# Patient Record
Sex: FEMALE | Race: WHITE | Employment: STUDENT | URBAN - METROPOLITAN AREA
[De-identification: names, ages, dates, MRNs, and addresses within clinical notes are randomized per-mention and may not be internally consistent; named-entity substitution may affect disease eponyms.]

---
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
## 2024-06-25 ENCOUNTER — HOSPITAL ENCOUNTER (INPATIENT)
Age: 19
LOS: 6 days | Discharge: HOME | DRG: 753 | End: 2024-07-01
Attending: EMERGENCY MEDICINE | Admitting: PSYCHIATRY & NEUROLOGY
Payer: MEDICAID

## 2024-06-25 DIAGNOSIS — F31.9 BIPOLAR AFFECTIVE DISORDER, REMISSION STATUS UNSPECIFIED (HCC): ICD-10-CM

## 2024-06-25 DIAGNOSIS — N30.00 ACUTE CYSTITIS WITHOUT HEMATURIA: Primary | ICD-10-CM

## 2024-06-25 LAB
ALBUMIN SERPL-MCNC: 4.7 G/DL (ref 3.5–4.6)
ALP SERPL-CCNC: 88 U/L (ref 40–130)
ALT SERPL-CCNC: 27 U/L (ref 0–33)
AMPHET UR QL SCN: NORMAL
ANION GAP SERPL CALCULATED.3IONS-SCNC: 10 MEQ/L (ref 9–15)
APAP SERPL-MCNC: <5 UG/ML (ref 10–30)
AST SERPL-CCNC: 18 U/L (ref 0–35)
BACTERIA URNS QL MICRO: ABNORMAL /HPF
BARBITURATES UR QL SCN: NORMAL
BASOPHILS # BLD: 0 K/UL (ref 0–0.2)
BASOPHILS NFR BLD: 0.3 %
BENZODIAZ UR QL SCN: NORMAL
BILIRUB SERPL-MCNC: <0.2 MG/DL (ref 0.2–0.7)
BILIRUB UR QL STRIP: NEGATIVE
BUN SERPL-MCNC: 14 MG/DL (ref 6–20)
CALCIUM SERPL-MCNC: 9.9 MG/DL (ref 8.5–9.9)
CANNABINOIDS UR QL SCN: NORMAL
CHLORIDE SERPL-SCNC: 105 MEQ/L (ref 95–107)
CHOLEST SERPL-MCNC: 163 MG/DL (ref 0–199)
CK SERPL-CCNC: 81 U/L (ref 0–170)
CLARITY UR: ABNORMAL
CO2 SERPL-SCNC: 23 MEQ/L (ref 20–31)
COCAINE UR QL SCN: NORMAL
COLOR UR: YELLOW
CREAT SERPL-MCNC: 0.62 MG/DL (ref 0.5–0.9)
DRUG SCREEN COMMENT UR-IMP: NORMAL
EOSINOPHIL # BLD: 0.1 K/UL (ref 0–0.7)
EOSINOPHIL NFR BLD: 0.7 %
EPI CELLS #/AREA URNS AUTO: ABNORMAL /HPF (ref 0–5)
ERYTHROCYTE [DISTWIDTH] IN BLOOD BY AUTOMATED COUNT: 12.6 % (ref 11.5–14.5)
ESTIMATED AVERAGE GLUCOSE: 100 MG/DL
ETHANOL PERCENT: NORMAL G/DL
ETHANOLAMINE SERPL-MCNC: <10 MG/DL (ref 0–0.08)
FENTANYL SCREEN, URINE: NORMAL
GLOBULIN SER CALC-MCNC: 3.2 G/DL (ref 2.3–3.5)
GLUCOSE SERPL-MCNC: 99 MG/DL (ref 70–99)
GLUCOSE UR STRIP-MCNC: NEGATIVE MG/DL
HBA1C MFR BLD: 5.1 % (ref 4–6)
HCG UR QL: NEGATIVE
HCT VFR BLD AUTO: 41.1 % (ref 37–47)
HDLC SERPL-MCNC: 37 MG/DL (ref 40–59)
HGB BLD-MCNC: 14.4 G/DL (ref 12–16)
HGB UR QL STRIP: NEGATIVE
HYALINE CASTS #/AREA URNS AUTO: ABNORMAL /HPF (ref 0–5)
KETONES UR STRIP-MCNC: NEGATIVE MG/DL
LDLC SERPL CALC-MCNC: 103 MG/DL (ref 0–129)
LEUKOCYTE ESTERASE UR QL STRIP: ABNORMAL
LYMPHOCYTES # BLD: 2.7 K/UL (ref 1–4.8)
LYMPHOCYTES NFR BLD: 36.3 %
MCH RBC QN AUTO: 31 PG (ref 27–31.3)
MCHC RBC AUTO-ENTMCNC: 35 % (ref 33–37)
MCV RBC AUTO: 88.6 FL (ref 79.4–94.8)
METHADONE UR QL SCN: NORMAL
MONOCYTES # BLD: 0.5 K/UL (ref 0.2–0.8)
MONOCYTES NFR BLD: 6.6 %
NEUTROPHILS # BLD: 4.1 K/UL (ref 1.4–6.5)
NEUTS SEG NFR BLD: 55.8 %
NITRITE UR QL STRIP: NEGATIVE
OPIATES UR QL SCN: NORMAL
OXYCODONE UR QL SCN: NORMAL
PCP UR QL SCN: NORMAL
PH UR STRIP: 7 [PH] (ref 5–9)
PLATELET # BLD AUTO: 334 K/UL (ref 130–400)
POTASSIUM SERPL-SCNC: 4.2 MEQ/L (ref 3.4–4.9)
PROPOXYPH UR QL SCN: NORMAL
PROT SERPL-MCNC: 7.9 G/DL (ref 6.3–8)
PROT UR STRIP-MCNC: NEGATIVE MG/DL
RBC # BLD AUTO: 4.64 M/UL (ref 4.2–5.4)
RBC #/AREA URNS AUTO: ABNORMAL /HPF (ref 0–5)
SALICYLATES SERPL-MCNC: <0.3 MG/DL (ref 15–30)
SODIUM SERPL-SCNC: 138 MEQ/L (ref 135–144)
SP GR UR STRIP: 1.02 (ref 1–1.03)
TRIGL SERPL-MCNC: 113 MG/DL (ref 0–150)
TSH SERPL-MCNC: 1.48 UIU/ML (ref 0.44–3.86)
URINE REFLEX TO CULTURE: YES
UROBILINOGEN UR STRIP-ACNC: 1 E.U./DL
WBC # BLD AUTO: 7.3 K/UL (ref 4.5–11)
WBC #/AREA URNS AUTO: ABNORMAL /HPF (ref 0–5)

## 2024-06-25 PROCEDURE — 6370000000 HC RX 637 (ALT 250 FOR IP): Performed by: PSYCHIATRY & NEUROLOGY

## 2024-06-25 PROCEDURE — 80143 DRUG ASSAY ACETAMINOPHEN: CPT

## 2024-06-25 PROCEDURE — 80307 DRUG TEST PRSMV CHEM ANLYZR: CPT

## 2024-06-25 PROCEDURE — 81003 URINALYSIS AUTO W/O SCOPE: CPT

## 2024-06-25 PROCEDURE — 85025 COMPLETE CBC W/AUTO DIFF WBC: CPT

## 2024-06-25 PROCEDURE — 84443 ASSAY THYROID STIM HORMONE: CPT

## 2024-06-25 PROCEDURE — 80061 LIPID PANEL: CPT

## 2024-06-25 PROCEDURE — 84703 CHORIONIC GONADOTROPIN ASSAY: CPT

## 2024-06-25 PROCEDURE — 80179 DRUG ASSAY SALICYLATE: CPT

## 2024-06-25 PROCEDURE — 99285 EMERGENCY DEPT VISIT HI MDM: CPT

## 2024-06-25 PROCEDURE — 80053 COMPREHEN METABOLIC PANEL: CPT

## 2024-06-25 PROCEDURE — 87086 URINE CULTURE/COLONY COUNT: CPT

## 2024-06-25 PROCEDURE — 1240000000 HC EMOTIONAL WELLNESS R&B

## 2024-06-25 PROCEDURE — 86403 PARTICLE AGGLUT ANTBDY SCRN: CPT

## 2024-06-25 PROCEDURE — 6370000000 HC RX 637 (ALT 250 FOR IP): Performed by: EMERGENCY MEDICINE

## 2024-06-25 PROCEDURE — 82550 ASSAY OF CK (CPK): CPT

## 2024-06-25 PROCEDURE — 36415 COLL VENOUS BLD VENIPUNCTURE: CPT

## 2024-06-25 PROCEDURE — 82077 ASSAY SPEC XCP UR&BREATH IA: CPT

## 2024-06-25 PROCEDURE — 83036 HEMOGLOBIN GLYCOSYLATED A1C: CPT

## 2024-06-25 RX ORDER — HALOPERIDOL 5 MG/ML
5 INJECTION INTRAMUSCULAR EVERY 6 HOURS PRN
Status: DISCONTINUED | OUTPATIENT
Start: 2024-06-25 | End: 2024-07-01 | Stop reason: HOSPADM

## 2024-06-25 RX ORDER — HYDROXYZINE HYDROCHLORIDE 50 MG/ML
50 INJECTION, SOLUTION INTRAMUSCULAR EVERY 6 HOURS PRN
Status: DISCONTINUED | OUTPATIENT
Start: 2024-06-25 | End: 2024-07-01 | Stop reason: HOSPADM

## 2024-06-25 RX ORDER — KETOCONAZOLE 20 MG/ML
2 SHAMPOO TOPICAL DAILY PRN
COMMUNITY
Start: 2024-05-22

## 2024-06-25 RX ORDER — MEDROXYPROGESTERONE ACETATE 150 MG/ML
150 INJECTION, SUSPENSION INTRAMUSCULAR
Status: ON HOLD | COMMUNITY
Start: 2024-03-12 | End: 2024-07-01 | Stop reason: HOSPADM

## 2024-06-25 RX ORDER — NITROFURANTOIN 25; 75 MG/1; MG/1
100 CAPSULE ORAL EVERY 12 HOURS SCHEDULED
Status: COMPLETED | OUTPATIENT
Start: 2024-06-25 | End: 2024-06-30

## 2024-06-25 RX ORDER — HYDROXYZINE HYDROCHLORIDE 25 MG/1
50 TABLET, FILM COATED ORAL 2 TIMES DAILY PRN
Status: ON HOLD | COMMUNITY
Start: 2023-11-17 | End: 2024-07-01 | Stop reason: HOSPADM

## 2024-06-25 RX ORDER — LAMOTRIGINE 100 MG/1
100 TABLET ORAL DAILY
Status: DISCONTINUED | OUTPATIENT
Start: 2024-06-26 | End: 2024-07-01 | Stop reason: HOSPADM

## 2024-06-25 RX ORDER — TRAZODONE HYDROCHLORIDE 50 MG/1
50 TABLET ORAL NIGHTLY PRN
Status: DISCONTINUED | OUTPATIENT
Start: 2024-06-25 | End: 2024-06-28

## 2024-06-25 RX ORDER — ACETAMINOPHEN 325 MG/1
650 TABLET ORAL EVERY 4 HOURS PRN
Status: DISCONTINUED | OUTPATIENT
Start: 2024-06-25 | End: 2024-07-01 | Stop reason: HOSPADM

## 2024-06-25 RX ORDER — LAMOTRIGINE 100 MG/1
100 TABLET ORAL DAILY
COMMUNITY
Start: 2024-04-03

## 2024-06-25 RX ORDER — FAMOTIDINE 20 MG/1
20 TABLET, FILM COATED ORAL DAILY
Status: ON HOLD | COMMUNITY
Start: 2024-05-10 | End: 2024-07-01 | Stop reason: HOSPADM

## 2024-06-25 RX ORDER — HALOPERIDOL 5 MG/1
5 TABLET ORAL EVERY 6 HOURS PRN
Status: DISCONTINUED | OUTPATIENT
Start: 2024-06-25 | End: 2024-07-01 | Stop reason: HOSPADM

## 2024-06-25 RX ORDER — PHENOL 1.4 %
10 AEROSOL, SPRAY (ML) MUCOUS MEMBRANE NIGHTLY
COMMUNITY

## 2024-06-25 RX ORDER — OXCARBAZEPINE 150 MG/1
1 TABLET, FILM COATED ORAL EVERY 12 HOURS
COMMUNITY
Start: 2023-11-03

## 2024-06-25 RX ORDER — POLYETHYLENE GLYCOL 3350 17 G/17G
17 POWDER, FOR SOLUTION ORAL DAILY PRN
Status: DISCONTINUED | OUTPATIENT
Start: 2024-06-25 | End: 2024-07-01 | Stop reason: HOSPADM

## 2024-06-25 RX ORDER — OXCARBAZEPINE 150 MG/1
150 TABLET, FILM COATED ORAL EVERY 12 HOURS
Status: DISCONTINUED | OUTPATIENT
Start: 2024-06-25 | End: 2024-07-01 | Stop reason: HOSPADM

## 2024-06-25 RX ORDER — HYDROXYZINE PAMOATE 50 MG/1
50 CAPSULE ORAL EVERY 6 HOURS PRN
Status: DISCONTINUED | OUTPATIENT
Start: 2024-06-25 | End: 2024-07-01 | Stop reason: HOSPADM

## 2024-06-25 RX ADMIN — NITROFURANTOIN MONOHYDRATE/MACROCRYSTALS 100 MG: 75; 25 CAPSULE ORAL at 20:44

## 2024-06-25 RX ADMIN — OXCARBAZEPINE 150 MG: 150 TABLET, FILM COATED ORAL at 21:54

## 2024-06-25 ASSESSMENT — LIFESTYLE VARIABLES
HOW OFTEN DO YOU HAVE A DRINK CONTAINING ALCOHOL: MONTHLY OR LESS
HOW MANY STANDARD DRINKS CONTAINING ALCOHOL DO YOU HAVE ON A TYPICAL DAY: 1 OR 2

## 2024-06-25 ASSESSMENT — SLEEP AND FATIGUE QUESTIONNAIRES
DO YOU HAVE DIFFICULTY SLEEPING: YES
AVERAGE NUMBER OF SLEEP HOURS: 6
SLEEP PATTERN: DIFFICULTY FALLING ASLEEP;RESTLESSNESS
DO YOU USE A SLEEP AID: NO
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
## 2024-06-25 NOTE — ED NOTES
Pt to [facility] bed 4. Changed into psych safe clothing. Belongings secured. Skin intact. Lab notified of new orders. Dr. Wild at bedside. Unable to provide urine specimen at this time. Fluids provided.
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
## 2024-06-26 PROCEDURE — 99223 1ST HOSP IP/OBS HIGH 75: CPT | Performed by: PSYCHIATRY & NEUROLOGY

## 2024-06-26 PROCEDURE — 6370000000 HC RX 637 (ALT 250 FOR IP): Performed by: PSYCHIATRY & NEUROLOGY

## 2024-06-26 PROCEDURE — 6370000000 HC RX 637 (ALT 250 FOR IP): Performed by: EMERGENCY MEDICINE

## 2024-06-26 PROCEDURE — 1240000000 HC EMOTIONAL WELLNESS R&B

## 2024-06-26 RX ORDER — LURASIDONE HYDROCHLORIDE 20 MG/1
20 TABLET, FILM COATED ORAL
Status: DISCONTINUED | OUTPATIENT
Start: 2024-06-26 | End: 2024-06-28

## 2024-06-26 RX ADMIN — TRAZODONE HYDROCHLORIDE 50 MG: 50 TABLET ORAL at 01:03

## 2024-06-26 RX ADMIN — OXCARBAZEPINE 150 MG: 150 TABLET, FILM COATED ORAL at 09:31

## 2024-06-26 RX ADMIN — TRAZODONE HYDROCHLORIDE 50 MG: 50 TABLET ORAL at 21:09

## 2024-06-26 RX ADMIN — OXCARBAZEPINE 150 MG: 150 TABLET, FILM COATED ORAL at 21:15

## 2024-06-26 RX ADMIN — NITROFURANTOIN MONOHYDRATE/MACROCRYSTALS 100 MG: 75; 25 CAPSULE ORAL at 21:09

## 2024-06-26 RX ADMIN — LURASIDONE HYDROCHLORIDE 20 MG: 20 TABLET, FILM COATED ORAL at 16:18

## 2024-06-26 RX ADMIN — LAMOTRIGINE 100 MG: 100 TABLET ORAL at 09:31

## 2024-06-26 RX ADMIN — ACETAMINOPHEN 325MG 650 MG: 325 TABLET ORAL at 16:18

## 2024-06-26 RX ADMIN — NITROFURANTOIN MONOHYDRATE/MACROCRYSTALS 100 MG: 75; 25 CAPSULE ORAL at 09:31

## 2024-06-26 ASSESSMENT — PATIENT HEALTH QUESTIONNAIRE - PHQ9
SUM OF ALL RESPONSES TO PHQ QUESTIONS 1-9: 17
3. TROUBLE FALLING OR STAYING ASLEEP: MORE THAN HALF THE DAYS
8. MOVING OR SPEAKING SO SLOWLY THAT OTHER PEOPLE COULD HAVE NOTICED. OR THE OPPOSITE, BEING SO FIGETY OR RESTLESS THAT YOU HAVE BEEN MOVING AROUND A LOT MORE THAN USUAL: NOT AT ALL
1. LITTLE INTEREST OR PLEASURE IN DOING THINGS: NEARLY EVERY DAY
SUM OF ALL RESPONSES TO PHQ9 QUESTIONS 1 & 2: 5
SUM OF ALL RESPONSES TO PHQ QUESTIONS 1-9: 14
4. FEELING TIRED OR HAVING LITTLE ENERGY: MORE THAN HALF THE DAYS
7. TROUBLE CONCENTRATING ON THINGS, SUCH AS READING THE NEWSPAPER OR WATCHING TELEVISION: NEARLY EVERY DAY
SUM OF ALL RESPONSES TO PHQ QUESTIONS 1-9: 17
9. THOUGHTS THAT YOU WOULD BE BETTER OFF DEAD, OR OF HURTING YOURSELF: NEARLY EVERY DAY
10. IF YOU CHECKED OFF ANY PROBLEMS, HOW DIFFICULT HAVE THESE PROBLEMS MADE IT FOR YOU TO DO YOUR WORK, TAKE CARE OF THINGS AT HOME, OR GET ALONG WITH OTHER PEOPLE: VERY DIFFICULT
SUM OF ALL RESPONSES TO PHQ QUESTIONS 1-9: 17
2. FEELING DOWN, DEPRESSED OR HOPELESS: MORE THAN HALF THE DAYS
5. POOR APPETITE OR OVEREATING: MORE THAN HALF THE DAYS

## 2024-06-26 ASSESSMENT — PAIN - FUNCTIONAL ASSESSMENT: PAIN_FUNCTIONAL_ASSESSMENT: PREVENTS OR INTERFERES SOME ACTIVE ACTIVITIES AND ADLS

## 2024-06-26 ASSESSMENT — PAIN DESCRIPTION - LOCATION: LOCATION: HEAD

## 2024-06-26 ASSESSMENT — LIFESTYLE VARIABLES
HOW MANY STANDARD DRINKS CONTAINING ALCOHOL DO YOU HAVE ON A TYPICAL DAY: PATIENT DOES NOT DRINK
HOW OFTEN DO YOU HAVE A DRINK CONTAINING ALCOHOL: NEVER

## 2024-06-26 ASSESSMENT — PAIN DESCRIPTION - DESCRIPTORS: DESCRIPTORS: ACHING

## 2024-06-26 ASSESSMENT — PAIN DESCRIPTION - ORIENTATION: ORIENTATION: MID

## 2024-06-26 NOTE — CARE COORDINATION
6/26/2024 @ 0821 - Patient was approached regarding the completion of the Leisure Assessment. She was isolating in her room from the unit. When asked about her leisure or coping strategies, patient stated she is not allowed to do anything she used to like to do. She was able to identify drawing, painting, and playing the violin as things she used to do when she was allowed. Patient stated she has emancipated herself from the control of her \"mother\". She stated she was living with a person other than her biological mother. In addition, patient stated this person is not a relative. She said she just called this person \"mother\". Patient continued stating that she has supports through social media friendships but she is/was not allowed to communicate with them. Her goal for this hospital stay is to get back on her medication. Patient remained isolated in her room at the end of this session.     Documentation completed by: Alyce Hall MA ATR

## 2024-06-26 NOTE — GROUP NOTE
Group Therapy Note    Date: 6/26/2024    Group Start Time: 1000  Group End Time: 1100  Group Topic: Art Therapy     [facility] Alyce Estrada, VONDA        Group Therapy Note    Attendees: 9       Patient's Goal:  To participate in morning group art therapy.     Notes:  Patient did not attend.     Modes of Intervention: Activity      Discipline Responsible: Psychoeducational Specialist      Signature:  Alyce Hall MA ATR
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
## 2024-06-26 NOTE — GROUP NOTE
Patient did not attend group.     Date: 6/26/2024    Group Start Time: 0920  Group End Time: 0942  Group Topic: Community Meeting    [facility] [place] [facility] Beba Sawant    \"Circles\" by Post Riley  Radha Analysis and Song Discussion    Patients will listen to \"Circles\" by Post Riley and discuss lyrics/themes/interpretations derived from the song. Patients will identify negative patterns in their lives and discuss why it is difficult to break them. Patients will brainstorm ideas of how to break their negative patterns.     Focus: Negative Cycles/Patterns, Processing, Self-Awareness     Goals: Increase Community Building/Socialization, Improve Mood, Increase Future Thinking, Improve Coping Skills, Increase Relaxation, Improve Insight/Self-Awareness, Increase Self-Expression, Improve Attention to Task    Documentation completed by Beba Doty, MT-BC, PsychoEd Spec

## 2024-06-26 NOTE — GROUP NOTE
Patient did not attend group.     Date: 6/26/2024    Group Start Time: 1320  Group End Time: 1420  Group Topic: Music Therapy    [facility] [place] 3[facility] Beba Sawant    Active Music Making, Live Music Listening, and Music Reminiscence  Patients will be given a songbook and offered the opportunity to select (a) song(s) of their choice for live music listening on [employer]. Patients will be encouraged to sing along, move along, and listen to the music.     Focus: Self-Expression, Processing, Self-Esteem, Coping Skills, and Building Positive Experiences    Goals: Improve Mood, Decrease Isolation, Increase Sense of Community/Socialization, Improve Self-Esteem, Improve Self-Expression, Provide Sense of Autonomy, Develop Coping Skills     Documentation completed by LORRI Metzger, PsychoEd Spec

## 2024-06-26 NOTE — CARE COORDINATION
Brief Intervention and Referral to Treatment Summary    Patient was provided PHQ-9, AUDIT-C and DAST Screening:      PHQ-9 Score: 17  AUDIT-C Score: 0   DAST Score:  0    Patient’s substance use is considered n/a     Low Risk/Healthy   Moderate Risk  Harmful  Dependent    Patient’s depression is considered:     Minimal  Mild   Moderate  Moderately Severe x  Severe    Brief Education Was Provided n/a    Patient was receptive  Patient was not receptive      Brief Intervention Is Provided (Only for AUDIT-C or DAST)   N/a  Patient reports readiness to decrease and/or stop use and a plan was discussed   Patient denies readiness to decrease and/or stop use and a plan was not discussed      Recommendations/Referrals for Brief and/or Specialized Treatment Provided to Patient   None needed at this time.

## 2024-06-26 NOTE — CONSULTS
MEDICAL HISTORY AND PHYSICAL             Date: 6/26/2024        Patient Name: Jassi Cool     YOB: 2005      Age:  19 y.o.    Chief Complaint     Chief Complaint   Patient presents with    Psychiatric Evaluation      History Obtained From   patient, electronic medical record    History of Present Illness   Jassi Cool is a pleasant 19-year-old  female with significant past medical history of seizure disorder, anxiety disorder, depression, ADHD, who was admitted for worsening depression with suicidal ideation with a plan to jump off a bridge.  IM hospitalist team was consulted for physical exam to rule out any co-morbid physical condition, and medically manage acute and chronic health conditions. At the time of examination, patient claims feeling better but admits to still being suicidal \"a little\".  She denies any presence of visual, auditory, and tactile hallucinations.  She denies headaches, dizziness, shortness of breath, chest pain, and N/V/D.    Past Medical History     Past Medical History:   Diagnosis Date    ADHD     Anxiety     Depression       Past Surgical History     Past Surgical History:   Procedure Laterality Date    DENTAL SURGERY      NASAL SINUS SURGERY        Medications Prior to Admission     Prior to Admission medications    Medication Sig Start Date End Date Taking? Authorizing Provider   ketoconazole (NIZORAL) 2 % shampoo Apply 2 % topically daily as needed for Itching 5/22/24  Yes Mc Shane MD   famotidine (PEPCID) 20 MG tablet Take 1 tablet by mouth daily  Patient not taking: Reported on 6/25/2024 5/10/24  Yes Mc Shane MD   hydrOXYzine HCl (ATARAX) 25 MG tablet Take 2 tablets by mouth 2 times daily as needed for Anxiety 11/17/23  Yes Mc Shane MD   lamoTRIgine (LAMICTAL) 100 MG tablet Take 1 tablet by mouth daily 4/3/24  Yes Mc Shane MD   medroxyPROGESTERone (DEPO-PROVERA) 150 MG/ML injection Inject 150
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
## 2024-06-26 NOTE — GROUP NOTE
Group Therapy Note    Date: 6/25/2024    Group Start Time: 2000  Group End Time: 2030  Group Topic: Wrap-Up    [facility] Arturo Roth    Patients started off tonight's wrap up group by introducing themselves. They shared their first name and an emotion that they are currently feeling. (A list of emotions was provided if needed). The focus of tonight's group was impulse control. Patients listened while a article about controlling impulses was read aloud. Throughout the reading, group stopped to discuss what was being talked about and how it relates to us. Patients were then prompted to answerer two questions as a group, \"How can we better manage our impulse control?\" And \"How can we manage a strong mindset in a tough situation?\".   Group Therapy Note    Attendees: 11/17       Patient's Goal:  \"To survive.\"     Notes:  Pt did not attend.    Modes of Intervention: Problem-solving and Activity      Discipline Responsible: Behavorial Health Tech      Signature:  Arturo Chaidez

## 2024-06-27 LAB
BACTERIA UR CULT: ABNORMAL
BACTERIA UR CULT: ABNORMAL
ORGANISM: ABNORMAL

## 2024-06-27 PROCEDURE — 99233 SBSQ HOSP IP/OBS HIGH 50: CPT | Performed by: PSYCHIATRY & NEUROLOGY

## 2024-06-27 PROCEDURE — 1240000000 HC EMOTIONAL WELLNESS R&B

## 2024-06-27 PROCEDURE — 6370000000 HC RX 637 (ALT 250 FOR IP): Performed by: PSYCHIATRY & NEUROLOGY

## 2024-06-27 PROCEDURE — 6370000000 HC RX 637 (ALT 250 FOR IP): Performed by: NURSE PRACTITIONER

## 2024-06-27 PROCEDURE — 6370000000 HC RX 637 (ALT 250 FOR IP): Performed by: EMERGENCY MEDICINE

## 2024-06-27 RX ORDER — KETOCONAZOLE 20 MG/ML
SHAMPOO TOPICAL DAILY PRN
Status: DISCONTINUED | OUTPATIENT
Start: 2024-06-27 | End: 2024-07-01 | Stop reason: HOSPADM

## 2024-06-27 RX ORDER — SUMATRIPTAN 50 MG/1
50 TABLET, FILM COATED ORAL ONCE
Status: COMPLETED | OUTPATIENT
Start: 2024-06-27 | End: 2024-06-27

## 2024-06-27 RX ADMIN — SUMATRIPTAN SUCCINATE 50 MG: 50 TABLET ORAL at 16:09

## 2024-06-27 RX ADMIN — ACETAMINOPHEN 325MG 650 MG: 325 TABLET ORAL at 09:32

## 2024-06-27 RX ADMIN — OXCARBAZEPINE 150 MG: 150 TABLET, FILM COATED ORAL at 09:31

## 2024-06-27 RX ADMIN — LAMOTRIGINE 100 MG: 100 TABLET ORAL at 09:31

## 2024-06-27 RX ADMIN — LURASIDONE HYDROCHLORIDE 20 MG: 20 TABLET, FILM COATED ORAL at 16:09

## 2024-06-27 RX ADMIN — NITROFURANTOIN MONOHYDRATE/MACROCRYSTALS 100 MG: 75; 25 CAPSULE ORAL at 09:31

## 2024-06-27 RX ADMIN — OXCARBAZEPINE 150 MG: 150 TABLET, FILM COATED ORAL at 21:47

## 2024-06-27 RX ADMIN — NITROFURANTOIN MONOHYDRATE/MACROCRYSTALS 100 MG: 75; 25 CAPSULE ORAL at 21:17

## 2024-06-27 RX ADMIN — TRAZODONE HYDROCHLORIDE 50 MG: 50 TABLET ORAL at 21:17

## 2024-06-27 ASSESSMENT — PAIN DESCRIPTION - LOCATION
LOCATION: HEAD
LOCATION: HEAD

## 2024-06-27 ASSESSMENT — PAIN SCALES - GENERAL
PAINLEVEL_OUTOF10: 7
PAINLEVEL_OUTOF10: 9
PAINLEVEL_OUTOF10: 0

## 2024-06-27 ASSESSMENT — PAIN DESCRIPTION - DESCRIPTORS
DESCRIPTORS: ACHING;THROBBING;POUNDING
DESCRIPTORS: ACHING

## 2024-06-27 ASSESSMENT — PAIN SCALES - WONG BAKER: WONGBAKER_NUMERICALRESPONSE: NO HURT

## 2024-06-27 ASSESSMENT — PAIN DESCRIPTION - ORIENTATION: ORIENTATION: LEFT;RIGHT
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
## 2024-06-27 NOTE — CARE COORDINATION
Family/Support Name: Deacon Davey  Contact #: [phone]  Relationship to Patient: significant other    Placed call to above for collateral information,    Response: Left VM

## 2024-06-27 NOTE — GROUP NOTE
Group Therapy Note    Date: 6/27/2024    Group Start Time: 1300  Group End Time: 1340  Group Topic:  Group    [facility] Lakesha Bojorquez LSW    Patient's learned to apply boundaries to their relationships with psycho-education and peer interaction.       Patient's Goal:  Patient did not attend.        Discipline Responsible: /Counselor      Signature:  MOE Francois

## 2024-06-27 NOTE — GROUP NOTE
Patient did not attend group.     Date: 6/27/2024    Group Start Time: 0930  Group End Time: 1015  Group Topic: Music Therapy    [facility] [place] 3W Beba Sawant    Building Self-Esteem through Music  Active Music-Making, Composition, Improvisation, Live Music Listening, Affirmations    Patients will choose from a variety of un-pitched and pitched percussion instruments. MT will accompany patient instrument playing with light guitar strumming. Patients will listen to a series of validation statements and be asked to play their instruments when they hear a statement they resonate with.      Patients will be given the option to select a self-love affirmation from a provided list, or to create their own statement. Patients will be encouraged to maintain a steady beat on the instrument of their choice while MT plays guitar accompaniment. Patients will rhythmically chant their affirmations and play their instruments. Patients will reflect on the overall group experience.    Focus: Validation/Support, Processing, Creative Expression, Self-Esteem Building  Goals: Improve Mood, Decrease Isolation, Increase Sense of Community/Socialization, Improve Self-Esteem, Improve Self-Expression, Provide Sense of Autonomy, Increase Sensory Stimulation, Develop Coping Skills, Improve Self-Awareness/Insight    Documentation completed by Beba Doty MT-BC, PsychoEd Spec
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
## 2024-06-28 PROCEDURE — 6370000000 HC RX 637 (ALT 250 FOR IP): Performed by: PSYCHIATRY & NEUROLOGY

## 2024-06-28 PROCEDURE — 1240000000 HC EMOTIONAL WELLNESS R&B

## 2024-06-28 PROCEDURE — 6370000000 HC RX 637 (ALT 250 FOR IP): Performed by: NURSE PRACTITIONER

## 2024-06-28 PROCEDURE — 6370000000 HC RX 637 (ALT 250 FOR IP): Performed by: EMERGENCY MEDICINE

## 2024-06-28 PROCEDURE — 99232 SBSQ HOSP IP/OBS MODERATE 35: CPT | Performed by: PSYCHIATRY & NEUROLOGY

## 2024-06-28 RX ORDER — MECOBALAMIN 5000 MCG
5 TABLET,DISINTEGRATING ORAL NIGHTLY
Status: DISCONTINUED | OUTPATIENT
Start: 2024-06-28 | End: 2024-06-29

## 2024-06-28 RX ORDER — LURASIDONE HYDROCHLORIDE 40 MG/1
40 TABLET, FILM COATED ORAL
Status: DISCONTINUED | OUTPATIENT
Start: 2024-06-28 | End: 2024-07-01 | Stop reason: HOSPADM

## 2024-06-28 RX ADMIN — LAMOTRIGINE 100 MG: 100 TABLET ORAL at 08:46

## 2024-06-28 RX ADMIN — OXCARBAZEPINE 150 MG: 150 TABLET, FILM COATED ORAL at 21:38

## 2024-06-28 RX ADMIN — Medication 5 MG: at 21:37

## 2024-06-28 RX ADMIN — OXCARBAZEPINE 150 MG: 150 TABLET, FILM COATED ORAL at 10:56

## 2024-06-28 RX ADMIN — LURASIDONE HYDROCHLORIDE 40 MG: 40 TABLET, FILM COATED ORAL at 16:57

## 2024-06-28 RX ADMIN — NITROFURANTOIN MONOHYDRATE/MACROCRYSTALS 100 MG: 75; 25 CAPSULE ORAL at 21:37

## 2024-06-28 RX ADMIN — HYDROXYZINE PAMOATE 50 MG: 50 CAPSULE ORAL at 04:17

## 2024-06-28 RX ADMIN — NITROFURANTOIN MONOHYDRATE/MACROCRYSTALS 100 MG: 75; 25 CAPSULE ORAL at 08:46

## 2024-06-28 RX ADMIN — KETOCONAZOLE: 20 SHAMPOO, SUSPENSION TOPICAL at 21:35

## 2024-06-28 NOTE — GROUP NOTE
Patient did not attend group.     Date: 6/28/2024    Group Start Time: 0925  Group End Time: 1015  Group Topic: Music Therapy    ML[facility] 3W Beba Sawant    Group Songwriting  Patients will listen to \"I Am Light\" by Marie Hodges and identify lyrics/interpretations/themes derived from the song. Patients will be asked to identify positive/negative qualities about themselves to be used in a group nathan substitution. Patients will be encouraged to contribute lyrics, recreate/perform the song, and discuss the experience as a group. Patients will additionally be given the option to add instruments to their recreation/performance.    Focus: Coping Skills, Building Happiness, Building Positive Experiences, and Validation/Support    Goals: Improve Expressive Communication, Improve Self-Expression, Improve Self-Esteem / Increase Confidence, Improve Mood, Increase Socialization, Develop Coping Skills / Outlet for Expression, Develop a Sense of Community, Decrease Negative Self-Talk, Improve Self-Awareness / Insight, Increase Sensory Stimulation    Documentation completed by LORRI Metzger, PsychoEd Spec

## 2024-06-28 NOTE — GROUP NOTE
Patient did not attend group.     Date: 6/28/2024    Group Start Time: 1340  Group End Time: 1410  Group Topic: Music Therapy    MLO[facility] 3W Beba Sawant    Identification of Meaningful Music  Music Interventions: Music Reminiscence and Receptive Music Listening    Patients will be invited to select a song that aligns with an important moment/time in their life. Patients will be encouraged to explain their connection to the song and the experience of listening to it in this setting.    Focus: Validation/Support, Self-Expression, and Identity Development    Goals: Improve Mood, Improve Insight/Self-Awareness, Increase Socialization/Community Building, Improve Self-Expression, Improve Attention to Task, Improve Coping Skills/Develop Coping Skills    Documentation completed by LORRI Metzger, PsychoEd Spec

## 2024-06-28 NOTE — CARE COORDINATION
FAMILY COLLATERAL NOTE      Family/Support Name: Deacon Davey  Contact #: [phone]  Relationship to Patient: significant other     LSW attempted to reach above for collateral information. Unable to leave a voicemail.    MOE Casas

## 2024-06-29 PROCEDURE — 1240000000 HC EMOTIONAL WELLNESS R&B

## 2024-06-29 PROCEDURE — 6370000000 HC RX 637 (ALT 250 FOR IP): Performed by: EMERGENCY MEDICINE

## 2024-06-29 PROCEDURE — 6370000000 HC RX 637 (ALT 250 FOR IP): Performed by: PSYCHIATRY & NEUROLOGY

## 2024-06-29 RX ORDER — LANOLIN ALCOHOL/MO/W.PET/CERES
6 CREAM (GRAM) TOPICAL NIGHTLY PRN
Status: DISCONTINUED | OUTPATIENT
Start: 2024-06-29 | End: 2024-07-01 | Stop reason: HOSPADM

## 2024-06-29 RX ADMIN — Medication 6 MG: at 22:37

## 2024-06-29 RX ADMIN — OXCARBAZEPINE 150 MG: 150 TABLET, FILM COATED ORAL at 21:56

## 2024-06-29 RX ADMIN — NITROFURANTOIN MONOHYDRATE/MACROCRYSTALS 100 MG: 75; 25 CAPSULE ORAL at 09:39

## 2024-06-29 RX ADMIN — LURASIDONE HYDROCHLORIDE 40 MG: 40 TABLET, FILM COATED ORAL at 16:04

## 2024-06-29 RX ADMIN — OXCARBAZEPINE 150 MG: 150 TABLET, FILM COATED ORAL at 09:39

## 2024-06-29 RX ADMIN — LAMOTRIGINE 100 MG: 100 TABLET ORAL at 09:39

## 2024-06-29 RX ADMIN — NITROFURANTOIN MONOHYDRATE/MACROCRYSTALS 100 MG: 75; 25 CAPSULE ORAL at 20:46

## 2024-06-29 RX ADMIN — ACETAMINOPHEN 325MG 650 MG: 325 TABLET ORAL at 09:38

## 2024-06-29 ASSESSMENT — PAIN DESCRIPTION - DESCRIPTORS: DESCRIPTORS: ACHING

## 2024-06-29 ASSESSMENT — PAIN DESCRIPTION - LOCATION: LOCATION: OTHER (COMMENT)

## 2024-06-29 ASSESSMENT — PAIN SCALES - GENERAL
PAINLEVEL_OUTOF10: 4
PAINLEVEL_OUTOF10: 0

## 2024-06-29 NOTE — GROUP NOTE
Group Therapy Note    Date: 6/29/2024    Group Start Time: 1405  Group End Time: 1505  Group Topic: Cognitive Skills    YOSELIN OLMOSI Emma Newell MSW        Group Therapy Note    Attendees: 7/17       Patient's Goal:  To avoid being mad at her boyfriend    Notes:  The client was present for the session. She was oriented 4x. She activity participated in the discussion and provided feedback to her peers.     Status After Intervention:  Unchanged    Participation Level: Active Listener    Participation Quality: Appropriate      Speech:  normal      Thought Process/Content: Logical      Affective Functioning: Congruent      Mood: euthymic      Level of consciousness:  Oriented x4      Response to Learning: Able to verbalize current knowledge/experience      Endings: None Reported    Modes of Intervention: Activity      Discipline Responsible: /Counselor      Signature:  RUI Ramírez

## 2024-06-30 PROCEDURE — 6370000000 HC RX 637 (ALT 250 FOR IP): Performed by: PSYCHIATRY & NEUROLOGY

## 2024-06-30 PROCEDURE — 6370000000 HC RX 637 (ALT 250 FOR IP): Performed by: EMERGENCY MEDICINE

## 2024-06-30 PROCEDURE — 1240000000 HC EMOTIONAL WELLNESS R&B

## 2024-06-30 RX ADMIN — OXCARBAZEPINE 150 MG: 150 TABLET, FILM COATED ORAL at 21:28

## 2024-06-30 RX ADMIN — Medication 6 MG: at 21:28

## 2024-06-30 RX ADMIN — LURASIDONE HYDROCHLORIDE 40 MG: 40 TABLET, FILM COATED ORAL at 16:17

## 2024-06-30 RX ADMIN — NITROFURANTOIN MONOHYDRATE/MACROCRYSTALS 100 MG: 75; 25 CAPSULE ORAL at 09:46

## 2024-06-30 RX ADMIN — LAMOTRIGINE 100 MG: 100 TABLET ORAL at 09:46

## 2024-06-30 RX ADMIN — ACETAMINOPHEN 325MG 650 MG: 325 TABLET ORAL at 09:45

## 2024-06-30 RX ADMIN — OXCARBAZEPINE 150 MG: 150 TABLET, FILM COATED ORAL at 09:46

## 2024-06-30 ASSESSMENT — PAIN SCALES - GENERAL
PAINLEVEL_OUTOF10: 8
PAINLEVEL_OUTOF10: 0

## 2024-06-30 ASSESSMENT — PAIN DESCRIPTION - LOCATION: LOCATION: HEAD

## 2024-06-30 ASSESSMENT — PAIN SCALES - WONG BAKER: WONGBAKER_NUMERICALRESPONSE: NO HURT

## 2024-06-30 ASSESSMENT — PAIN DESCRIPTION - DESCRIPTORS: DESCRIPTORS: POUNDING

## 2024-06-30 NOTE — FLOWSHEET NOTE
The patient requested melatonin due to poor sleep. Administered per mar with melatonin tablet 6 mg po PRN.

## 2024-07-01 VITALS
RESPIRATION RATE: 18 BRPM | TEMPERATURE: 97.5 F | OXYGEN SATURATION: 100 % | WEIGHT: 150 LBS | HEIGHT: 62 IN | SYSTOLIC BLOOD PRESSURE: 115 MMHG | HEART RATE: 100 BPM | BODY MASS INDEX: 27.6 KG/M2 | DIASTOLIC BLOOD PRESSURE: 84 MMHG

## 2024-07-01 PROCEDURE — 99239 HOSP IP/OBS DSCHRG MGMT >30: CPT | Performed by: PSYCHIATRY & NEUROLOGY

## 2024-07-01 PROCEDURE — 6370000000 HC RX 637 (ALT 250 FOR IP): Performed by: PSYCHIATRY & NEUROLOGY

## 2024-07-01 RX ORDER — LURASIDONE HYDROCHLORIDE 40 MG/1
40 TABLET, FILM COATED ORAL
Qty: 15 TABLET | Refills: 2 | Status: SHIPPED | OUTPATIENT
Start: 2024-07-01

## 2024-07-01 RX ADMIN — OXCARBAZEPINE 150 MG: 150 TABLET, FILM COATED ORAL at 09:38

## 2024-07-01 RX ADMIN — LAMOTRIGINE 100 MG: 100 TABLET ORAL at 08:20

## 2024-07-01 RX ADMIN — HYDROXYZINE PAMOATE 50 MG: 50 CAPSULE ORAL at 04:34

## 2024-07-01 NOTE — FLOWSHEET NOTE
The patient requested melatonin due to poor sleep. Administered per mar with melatonin 6 mg po PRN.
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
## 2024-07-01 NOTE — DISCHARGE INSTR - DIET
Good nutrition is important when healing from an illness, injury, or surgery.  Follow any nutrition recommendations given to you during your hospital stay.   If you were given an oral nutrition supplement while in the hospital, continue to take this supplement at home.  You can take it with meals, in-between meals, and/or before bedtime. These supplements can be purchased at most local grocery stores, pharmacies, and chain [employer]-stores.   If you have any questions about your diet or nutrition, call the hospital and ask for the dietitian.  Resume as tolerated.

## 2024-07-01 NOTE — DISCHARGE INSTRUCTIONS
Someone from [facility] will be calling you tomorrow to follow up on your care. If you don't hear from us, give us a call! [phone].    Keep all follow up appointments, take medications as ordered, utilize positive supports, abstain from use of alcohol and drugs. If symptoms return or you feel at risk to yourself or others, please call 911, return the nearest emergency room, or call your local crisis hotline:  [facility]: [phone]  [facility]: [phone]  [facility]: [phone]    Due to the Covid-19 Pandemic, [facility] is not currently available. For assistance with quitting smoking please go to https://smokefree.gov. A prescription for an FDA-approved tobacco cessation medication was offered at discharge and the patient refused.    You were offered a flu vaccine but declined
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
## 2024-07-01 NOTE — PLAN OF CARE
Problem: Self Harm/Suicidality  Goal: Will have no self-injury during hospital stay  6/30/2024 0101 by Neeta Cabrera RN  Outcome: Progressing  6/29/2024 1216 by Jassi Adame RN  Outcome: Progressing     Problem: Risk for Elopement  Goal: Patient will not exit the unit/facility without proper excort  6/30/2024 0101 by Neeta Cabrera RN  Outcome: Progressing  6/29/2024 1216 by Jassi Adame RN  Outcome: Progressing     Problem: Safety - Adult  Goal: Free from fall injury  6/30/2024 0101 by Neeta Cabrera RN  Outcome: Progressing  6/29/2024 1216 by Jassi Adame RN  Outcome: Progressing     Problem: Anxiety  Goal: Will report anxiety at manageable levels  6/30/2024 0101 by Neeta Cabrera RN  Outcome: Progressing  6/29/2024 1216 by Jassi Adame RN  Outcome: Progressing     Problem: Coping  Goal: Pt/Family able to verbalize concerns and demonstrate effective coping strategies  6/30/2024 0101 by Neeta Cabrera RN  Outcome: Progressing  6/29/2024 1216 by Jassi Adame RN  Outcome: Progressing     Problem: Depression/Self Harm  Goal: Effect of psychiatric condition will be minimized and patient will be protected from self harm  6/30/2024 0101 by Neeta Cabrera RN  Outcome: Progressing  6/29/2024 1216 by Jassi Adame RN  Outcome: Progressing     Problem: Discharge Planning  Goal: Discharge to home or other facility with appropriate resources  6/30/2024 0101 by Neeta Cabrera RN  Outcome: Progressing  6/29/2024 1216 by Jassi Adame RN  Outcome: Progressing     
  Problem: Self Harm/Suicidality  Goal: Will have no self-injury during hospital stay  Description: INTERVENTIONS:  1.  Ensure constant observer at bedside with Q15M safety checks  2.  Maintain a safe environment  3.  Secure patient belongings  4.  Ensure family/visitors adhere to safety recommendations  5.  Ensure safety tray has been added to patient's diet order  6.  Every shift and PRN: Re-assess suicidal risk via Frequent Screener    6/26/2024 2220 by Cassie Campos RN  Outcome: Progressing  6/26/2024 1038 by Jassi Adame RN  Outcome: Progressing     Problem: Risk for Elopement  Goal: Patient will not exit the unit/facility without proper excort  6/26/2024 2220 by Cassie Campos RN  Outcome: Progressing  6/26/2024 1038 by Jassi Adame RN  Outcome: Progressing  Flowsheets (Taken 6/26/2024 1026)  Nursing Interventions for Elopement Risk:   Reduce environmental triggers   Make sure patient has all necessary personal care items     Problem: Safety - Adult  Goal: Free from fall injury  6/26/2024 2221 by Cassie Campos RN  Outcome: Progressing  6/26/2024 2220 by Cassie Campos RN  Outcome: Progressing  6/26/2024 1038 by Jassi Adame RN  Outcome: Progressing     Problem: Anxiety  Goal: Will report anxiety at manageable levels  Description: INTERVENTIONS:  1. Administer medication as ordered  2. Teach and rehearse alternative coping skills  3. Provide emotional support with 1:1 interaction with staff  Outcome: Progressing     Problem: Coping  Goal: Pt/Family able to verbalize concerns and demonstrate effective coping strategies  Description: INTERVENTIONS:  1. Assist patient/family to identify coping skills, available support systems and cultural and spiritual values  2. Provide emotional support, including active listening and acknowledgement of concerns of patient and caregivers  3. Reduce environmental stimuli, as able  4. Instruct patient/family in relaxation techniques, as appropriate  5. 
  Problem: Self Harm/Suicidality  Goal: Will have no self-injury during hospital stay  Description: INTERVENTIONS:  1.  Ensure constant observer at bedside with Q15M safety checks  2.  Maintain a safe environment  3.  Secure patient belongings  4.  Ensure family/visitors adhere to safety recommendations  5.  Ensure safety tray has been added to patient's diet order  6.  Every shift and PRN: Re-assess suicidal risk via Frequent Screener    6/27/2024 2048 by Cassie Campos RN  Outcome: Progressing  Flowsheets (Taken 6/27/2024 1630 by Sharmaine Martin, RN)  Will have no self-injury during hospital stay:   Every shift and PRN: Re-assess suicidal risk via Frequent Screener   Maintain a safe environment  6/27/2024 1142 by Sharmaine Martin RN  Outcome: Progressing     Problem: Risk for Elopement  Goal: Patient will not exit the unit/facility without proper excort  6/27/2024 2048 by Cassie Campos RN  Outcome: Progressing  Flowsheets (Taken 6/27/2024 1630 by Sharmaine Martin, RN)  Nursing Interventions for Elopement Risk:   Reduce environmental triggers   Make sure patient has all necessary personal care items  6/27/2024 1142 by Sharmaine Martin RN  Outcome: Progressing     Problem: Safety - Adult  Goal: Free from fall injury  6/27/2024 2048 by Cassie Campos RN  Outcome: Progressing  6/27/2024 1142 by Sharmaine Martin RN  Outcome: Progressing     Problem: Anxiety  Goal: Will report anxiety at manageable levels  Description: INTERVENTIONS:  1. Administer medication as ordered  2. Teach and rehearse alternative coping skills  3. Provide emotional support with 1:1 interaction with staff  6/27/2024 2048 by Cassie Campos RN  Outcome: Progressing  Flowsheets (Taken 6/27/2024 1630 by Sharmaine Martin, RN)  Will report anxiety at manageable levels:   Provide emotional support with 1:1 interaction with staff   Teach and rehearse alternative coping skills  6/27/2024 1142 by Sharmaine Martin, RN  Outcome: Progressing     Problem: 
  Problem: Self Harm/Suicidality  Goal: Will have no self-injury during hospital stay  Description: INTERVENTIONS:  1.  Ensure constant observer at bedside with Q15M safety checks  2.  Maintain a safe environment  3.  Secure patient belongings  4.  Ensure family/visitors adhere to safety recommendations  5.  Ensure safety tray has been added to patient's diet order  6.  Every shift and PRN: Re-assess suicidal risk via Frequent Screener    6/28/2024 1734 by Ana Gibbs, RN  Outcome: Progressing  6/28/2024 1734 by Ana Gibbs, RN  Outcome: Progressing  6/28/2024 1640 by Ana Gibbs, RN  Outcome: Progressing     Problem: Risk for Elopement  Goal: Patient will not exit the unit/facility without proper excort  6/28/2024 1734 by Ana Gibbs, RN  Outcome: Progressing  6/28/2024 1734 by Ana Gibbs, RN  Outcome: Progressing  6/28/2024 1640 by Ana Gibbs, RN  Outcome: Progressing     Problem: Safety - Adult  Goal: Free from fall injury  6/28/2024 1734 by Ana Gibbs, RN  Outcome: Progressing  6/28/2024 1734 by Ana Gibbs, RN  Outcome: Progressing     Problem: Anxiety  Goal: Will report anxiety at manageable levels  Description: INTERVENTIONS:  1. Administer medication as ordered  2. Teach and rehearse alternative coping skills  3. Provide emotional support with 1:1 interaction with staff  6/28/2024 1734 by Ana Gibbs, RN  Outcome: Progressing  6/28/2024 1734 by Ana Gibbs, RN  Outcome: Progressing  6/28/2024 1640 by Ana Gibbs, RN  Outcome: Progressing     Problem: Coping  Goal: Pt/Family able to verbalize concerns and demonstrate effective coping strategies  Description: INTERVENTIONS:  1. Assist patient/family to identify coping skills, available support systems and cultural and spiritual values  2. Provide emotional support, including active listening and acknowledgement of concerns of patient and caregivers  3. Reduce environmental stimuli, as able  4. Instruct patient/family in relaxation 
  Problem: Self Harm/Suicidality  Goal: Will have no self-injury during hospital stay  Description: INTERVENTIONS:  1.  Ensure constant observer at bedside with Q15M safety checks  2.  Maintain a safe environment  3.  Secure patient belongings  4.  Ensure family/visitors adhere to safety recommendations  5.  Ensure safety tray has been added to patient's diet order  6.  Every shift and PRN: Re-assess suicidal risk via Frequent Screener    6/28/2024 2146 by Cassie Campos RN  Outcome: Progressing  Flowsheets (Taken 6/28/2024 1740 by Ana Gibbs, RN)  Will have no self-injury during hospital stay:   Ensure constant observer at bedside with Q15M safety checks   Maintain a safe environment   Secure patient belongings  6/28/2024 1734 by Ana Gibbs, RN  Outcome: Progressing  6/28/2024 1734 by Ana Gibbs RN  Outcome: Progressing  6/28/2024 1640 by Ana Gibbs, RN  Outcome: Progressing     Problem: Risk for Elopement  Goal: Patient will not exit the unit/facility without proper excort  6/28/2024 2146 by Cassie Campos RN  Outcome: Progressing  Flowsheets (Taken 6/28/2024 1740 by Ana Gibbs, RN)  Nursing Interventions for Elopement Risk: Reduce environmental triggers  6/28/2024 1734 by Ana Gibbs, RN  Outcome: Progressing  6/28/2024 1734 by Ana Gibbs, RN  Outcome: Progressing  6/28/2024 1640 by Ana Gibbs, RN  Outcome: Progressing     Problem: Safety - Adult  Goal: Free from fall injury  6/28/2024 2146 by Cassie Campos, RN  Outcome: Progressing  6/28/2024 1734 by Ana Gibbs, RN  Outcome: Progressing  6/28/2024 1734 by Ana Gibbs, RN  Outcome: Progressing     Problem: Anxiety  Goal: Will report anxiety at manageable levels  Description: INTERVENTIONS:  1. Administer medication as ordered  2. Teach and rehearse alternative coping skills  3. Provide emotional support with 1:1 interaction with staff  6/28/2024 2146 by Cassie Campos RN  Outcome: Progressing  Flowsheets (Taken 6/28/2024 
  Problem: Self Harm/Suicidality  Goal: Will have no self-injury during hospital stay  Outcome: Progressing     Problem: Risk for Elopement  Goal: Patient will not exit the unit/facility without proper excort  Outcome: Progressing     Problem: Safety - Adult  Goal: Free from fall injury  Outcome: Progressing     
Patient isolates to her room for periods of time. She is cooperative with staff  but socializes little with peers. Out on the unit for periods but on periphery. She was encouraged to attend groups, \" I feel claustrophobic in groups. But stated this makes her feel uncomfortable, \" I always feel like people are looking at me\".  Problem: Self Harm/Suicidality  Goal: Will have no self-injury during hospital stay  Description: INTERVENTIONS:  1.  Ensure constant observer at bedside with Q15M safety checks  2.  Maintain a safe environment  3.  Secure patient belongings  4.  Ensure family/visitors adhere to safety recommendations  5.  Ensure safety tray has been added to patient's diet order  6.  Every shift and PRN: Re-assess suicidal risk via Frequent Screener    Outcome: Progressing     Problem: Risk for Elopement  Goal: Patient will not exit the unit/facility without proper excort  Outcome: Progressing     Problem: Anxiety  Goal: Will report anxiety at manageable levels  Description: INTERVENTIONS:  1. Administer medication as ordered  2. Teach and rehearse alternative coping skills  3. Provide emotional support with 1:1 interaction with staff  Outcome: Progressing     Problem: Coping  Goal: Pt/Family able to verbalize concerns and demonstrate effective coping strategies  Description: INTERVENTIONS:  1. Assist patient/family to identify coping skills, available support systems and cultural and spiritual values  2. Provide emotional support, including active listening and acknowledgement of concerns of patient and caregivers  3. Reduce environmental stimuli, as able  4. Instruct patient/family in relaxation techniques, as appropriate  5. Assess for spiritual pain/suffering and initiate Spiritual Care, Psychosocial Clinical Specialist consults as needed  Outcome: Progressing     Problem: Depression/Self Harm  Goal: Effect of psychiatric condition will be minimized and patient will be protected from self 
Patient observed out on unit, social and visible. Pt elevated in mood, noted to euphoric at times.  Pt incongruent with reports of depression and anxiety, both 2/10 (with 10 being the highest) Pt reported a plan to move to [place] yesterday but today she reported a new plan, to move in with a female peer who newly arrived to unit last night. Pt denies SI, HI and AVH. She is discharge focused to get back to [employer] school and wants to transfer to a different [employer] location for her employment. Pt is requesting proof of hospitalization for both her school and employer. No additonal concerns verbalized to staff this evening. Pt states she feels \"much better\" back on her medications.   Problem: Self Harm/Suicidality  Goal: Will have no self-injury during hospital stay  6/30/2024 2149 by Neeta Cabrera RN  Outcome: Progressing  6/30/2024 1358 by Jassi Adame RN  Outcome: Progressing     Problem: Risk for Elopement  Goal: Patient will not exit the unit/facility without proper excort  6/30/2024 2149 by Neeta Cabrera RN  Outcome: Progressing  6/30/2024 1358 by Jassi Adame RN  Outcome: Progressing     Problem: Safety - Adult  Goal: Free from fall injury  6/30/2024 2149 by Neeta Cabrera RN  Outcome: Progressing  6/30/2024 1358 by Jassi Adame RN  Outcome: Progressing     Problem: Anxiety  Goal: Will report anxiety at manageable levels  6/30/2024 2149 by Neeta Cabrera RN  Outcome: Progressing  6/30/2024 1358 by Jassi Adame RN  Outcome: Progressing     Problem: Coping  Goal: Pt/Family able to verbalize concerns and demonstrate effective coping strategies  6/30/2024 2149 by Neeta Cabrera RN  Outcome: Progressing  6/30/2024 1358 by Jassi Adame RN  Outcome: Progressing     Problem: Depression/Self Harm  Goal: Effect of psychiatric condition will be minimized and patient will be protected from self harm  6/30/2024 2149 by Neeta Cabrera RN  Outcome: Progressing  6/30/2024 1358 by Jassi Adame RN  Outcome: 
Patient remained calm and cooperative with shift assessment.  Patient appeared sad with flat affect and fair eye contact.  Patient noted to be more visible in the day room this morning isolating to self.  Patient reported anxiety 6/10 and depression 7/10 on a scale of 1-10 with ten being the worst.  Patient denying SI/HI/AVH and contracts for safety on 3W.  Patient reporting good sleep and appetite.  Patient in his own words says mood today is \"fine.\"  Patient stated goal today is to attend all groups.  Behavior remains in control.  Will continue to monitor.  Problem: Self Harm/Suicidality  Goal: Will have no self-injury during hospital stay  Description: INTERVENTIONS:  1.  Ensure constant observer at bedside with Q15M safety checks  2.  Maintain a safe environment  3.  Secure patient belongings  4.  Ensure family/visitors adhere to safety recommendations  5.  Ensure safety tray has been added to patient's diet order  6.  Every shift and PRN: Re-assess suicidal risk via Frequent Screener    6/26/2024 1038 by Jassi Adame RN  Outcome: Progressing  6/25/2024 2348 by Neeta Cabrera RN  Outcome: Progressing     Problem: Risk for Elopement  Goal: Patient will not exit the unit/facility without proper excort  6/26/2024 1038 by Jassi Adame RN  Outcome: Progressing  Flowsheets (Taken 6/26/2024 1026)  Nursing Interventions for Elopement Risk:   Reduce environmental triggers   Make sure patient has all necessary personal care items  6/25/2024 2348 by Neeta Cabrera RN  Outcome: Progressing     Problem: Safety - Adult  Goal: Free from fall injury  6/26/2024 1038 by Jassi Adame, RN  Outcome: Progressing  6/25/2024 2348 by Neeta Cabrera RN  Outcome: Progressing     
medication as ordered  2. Teach and rehearse alternative coping skills  3. Provide emotional support with 1:1 interaction with staff  6/30/2024 1358 by Jassi Adame RN  Outcome: Progressing  Flowsheets (Taken 6/30/2024 1349)  Will report anxiety at manageable levels: Provide emotional support with 1:1 interaction with staff  6/30/2024 0101 by Neeta Cabrera RN  Outcome: Progressing     Problem: Coping  Goal: Pt/Family able to verbalize concerns and demonstrate effective coping strategies  Description: INTERVENTIONS:  1. Assist patient/family to identify coping skills, available support systems and cultural and spiritual values  2. Provide emotional support, including active listening and acknowledgement of concerns of patient and caregivers  3. Reduce environmental stimuli, as able  4. Instruct patient/family in relaxation techniques, as appropriate  5. Assess for spiritual pain/suffering and initiate Spiritual Care, Psychosocial Clinical Specialist consults as needed  6/30/2024 1358 by Jassi Adame RN  Outcome: Progressing  Flowsheets (Taken 6/30/2024 1349)  Patient/family able to verbalize anxieties, fears, and concerns, and demonstrate effective coping: Provide emotional support, including active listening and acknowledgement of concerns of patient and caregivers  6/30/2024 0101 by Neeta Cabrera RN  Outcome: Progressing     Problem: Depression/Self Harm  Goal: Effect of psychiatric condition will be minimized and patient will be protected from self harm  Description: INTERVENTIONS:  1. Assess impact of patient's symptoms on level of functioning, self care needs and offer support as indicated  2. Assess patient/family knowledge of depression, impact on illness and need for teaching  3. Provide emotional support, presence and reassurance  4. Assess for possible suicidal thoughts or ideation. If patient expresses suicidal thoughts or statements do not leave alone, initiate Suicide Precautions, move to a room 
verbalize concerns and demonstrate effective coping strategies  Description: INTERVENTIONS:  1. Assist patient/family to identify coping skills, available support systems and cultural and spiritual values  2. Provide emotional support, including active listening and acknowledgement of concerns of patient and caregivers  3. Reduce environmental stimuli, as able  4. Instruct patient/family in relaxation techniques, as appropriate  5. Assess for spiritual pain/suffering and initiate Spiritual Care, Psychosocial Clinical Specialist consults as needed  6/27/2024 1142 by Sharmaine Martin RN  Outcome: Progressing  6/26/2024 2221 by Cassie Campos RN  Outcome: Progressing     Problem: Depression/Self Harm  Goal: Effect of psychiatric condition will be minimized and patient will be protected from self harm  Description: INTERVENTIONS:  1. Assess impact of patient's symptoms on level of functioning, self care needs and offer support as indicated  2. Assess patient/family knowledge of depression, impact on illness and need for teaching  3. Provide emotional support, presence and reassurance  4. Assess for possible suicidal thoughts or ideation. If patient expresses suicidal thoughts or statements do not leave alone, initiate Suicide Precautions, move to a room close to the nursing station and obtain sitter  5. Initiate consults as appropriate with Mental Health Professional, Spiritual Care, Psychosocial CNS, and consider a recommendation to the [facility]IP for a Psychiatric Consultation  6/27/2024 1142 by Sharmaine Martin RN  Outcome: Progressing  6/26/2024 2221 by Cassie Campos RN  Outcome: Progressing     Problem: Discharge Planning  Goal: Discharge to home or other facility with appropriate resources  Outcome: Progressing     
protected from self harm: Provide emotional support, presence and reassurance     Problem: Discharge Planning  Goal: Discharge to home or other facility with appropriate resources  Outcome: Progressing
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
## 2024-07-01 NOTE — GROUP NOTE
Date: 7/1/2024    Group Start Time: 0935  Group End Time: 1030  Group Topic: Music Therapy    ML 3W Beba Sawant    Identity Development & Personal Strength Identification through Music  Radha Analysis/Song Discussion, Receptive Music Listening, Exploration, Clarification    Patients will listen to \"Who You Are\" by Priya SAMUEL & \"Strength, Courage, and Call\" by Marie Hodges and identify themes/lyrics/interpretations derived from each song. Patients will discuss personal strengths, challenges they've overcome, and how they got through difficult times.     Themes explored in group discussion today: Generational/societal expectations; impact/influence of past trauma on our thinking/identity; peer perception versus self-perception in terms of mental health recovery     Goals: Improve Mood, Improve Insight/Self-Awareness, Increase Socialization/Community Building, Improve Self-Expression, Improve Attention to Task, Decrease Negative Thinking, Promote Identity Development    Patient listened to recorded music and engaged in peer song discussions. Patient explained how the influence of beauty standards/societal expectations impacted her mental health growing up, as well as connecting how her trauma has impacted her current experiences. Patient socialized with peers and staff. Patient identified her personal strengths as being \"bubbly\" and funny. Patient shared that she plays her violin as a way to cope. Patient affirmed peers' contributions to group and expressed that she found group to be beneficial.     Attended: 3/4-full attendance  Participation Level: Active Listener and Interactive  Participation Quality: Attentive, Sharing, and Supportive  Affect/Mood: Congruent/Euthymic and Elevated  Speech: spontaneous, normal rate, and normal volume   Thought Content/Processes: Linear  Level of consciousness: Alert  Response: Able to verbalize current knowledge/experience  Outcomes: Successfully internalized the purpose of